# Patient Record
Sex: FEMALE | Race: WHITE | URBAN - METROPOLITAN AREA
[De-identification: names, ages, dates, MRNs, and addresses within clinical notes are randomized per-mention and may not be internally consistent; named-entity substitution may affect disease eponyms.]

---

## 2017-02-27 ENCOUNTER — IMPORTED ENCOUNTER (OUTPATIENT)
Dept: URBAN - METROPOLITAN AREA CLINIC 50 | Facility: CLINIC | Age: 47
End: 2017-02-27

## 2017-03-06 ENCOUNTER — IMPORTED ENCOUNTER (OUTPATIENT)
Dept: URBAN - METROPOLITAN AREA CLINIC 50 | Facility: CLINIC | Age: 47
End: 2017-03-06

## 2018-05-21 ENCOUNTER — IMPORTED ENCOUNTER (OUTPATIENT)
Dept: URBAN - METROPOLITAN AREA CLINIC 50 | Facility: CLINIC | Age: 48
End: 2018-05-21

## 2019-03-11 NOTE — PATIENT DISCUSSION
Retinal Pigment Changes OD - Pt was found to have pigmentary changes of the macula that are non-specific. These may be age related benign changes or the first signs of macular degeneration. Patient was advised to keep good followup to deturmine their etiology over time.

## 2019-03-11 NOTE — PATIENT DISCUSSION
1.  Recent onset Serous Retinopathy OD--Refer to retina 2. Pt recently dx with A FIb and on new meds. 3. Mild  Combined Types of Cataract OU: Explained how cataracts can effect vision. Recommend clinical observation. The patient was advised to contact us if any change or worsening of vision. 4.   Refer to retina for eval of OD

## 2019-03-12 NOTE — PATIENT DISCUSSION
SEES MONOCULAR DOUBLE VISION OD - WITH MORE BLURRING ON 3 11 19 - HAS IMPROVED SINCE THEN BUT STILL NOT TOTALLY CLEAR.

## 2021-04-17 ASSESSMENT — TONOMETRY
OS_IOP_MMHG: 16
OS_IOP_MMHG: 15
OD_IOP_MMHG: 16
OD_IOP_MMHG: 15

## 2021-04-17 ASSESSMENT — VISUAL ACUITY
OD_CC: 20/20-1
OD_CC: 20/20
OS_CC: 20/25-2
OS_CC: 20/25

## 2023-08-25 ENCOUNTER — PREPPED CHART (OUTPATIENT)
Dept: URBAN - METROPOLITAN AREA CLINIC 50 | Facility: LOCATION | Age: 53
End: 2023-08-25

## 2023-08-28 ENCOUNTER — NEW PATIENT (OUTPATIENT)
Dept: URBAN - METROPOLITAN AREA CLINIC 50 | Facility: LOCATION | Age: 53
End: 2023-08-28

## 2023-08-28 DIAGNOSIS — Z79.899: ICD-10-CM

## 2023-08-28 DIAGNOSIS — Z01.01: ICD-10-CM

## 2023-08-28 PROCEDURE — 92134 CPTRZ OPH DX IMG PST SGM RTA: CPT

## 2023-08-28 PROCEDURE — 92004 COMPRE OPH EXAM NEW PT 1/>: CPT

## 2023-08-28 ASSESSMENT — VISUAL ACUITY
OS_GLARE: 20/30
OU_CC: 20/25
OU_CC: J1@16"
OU_SC: J1+@16"
OS_GLARE: 20/40
OD_CC: 20/25
OD_GLARE: 20/400
OD_SC: CF 3FT
OS_CC: 20/25+1
OU_SC: CF 3FT
OS_SC: CF 3FT
OD_GLARE: 20/40

## 2023-08-28 ASSESSMENT — TONOMETRY
OD_IOP_MMHG: 16
OS_IOP_MMHG: 16

## 2023-09-18 ENCOUNTER — DIAGNOSTICS ONLY (OUTPATIENT)
Dept: URBAN - METROPOLITAN AREA CLINIC 50 | Facility: LOCATION | Age: 53
End: 2023-09-18

## 2023-09-18 DIAGNOSIS — Z79.899: ICD-10-CM

## 2023-09-18 PROCEDURE — 92083 EXTENDED VISUAL FIELD XM: CPT

## 2024-12-06 ENCOUNTER — DIAGNOSTICS ONLY (OUTPATIENT)
Age: 54
End: 2024-12-06

## 2024-12-06 DIAGNOSIS — Z79.899: ICD-10-CM

## 2024-12-06 PROCEDURE — 92083 EXTENDED VISUAL FIELD XM: CPT

## 2024-12-06 PROCEDURE — 92134 CPTRZ OPH DX IMG PST SGM RTA: CPT

## 2025-04-28 ENCOUNTER — COMPREHENSIVE EXAM (OUTPATIENT)
Age: 55
End: 2025-04-28

## 2025-04-28 DIAGNOSIS — H52.4: ICD-10-CM

## 2025-04-28 DIAGNOSIS — Z79.899: ICD-10-CM

## 2025-04-28 PROCEDURE — 99214 OFFICE O/P EST MOD 30 MIN: CPT

## 2025-04-28 PROCEDURE — 92015 DETERMINE REFRACTIVE STATE: CPT

## 2025-04-28 PROCEDURE — 92134 CPTRZ OPH DX IMG PST SGM RTA: CPT
